# Patient Record
Sex: MALE | Race: WHITE | URBAN - METROPOLITAN AREA
[De-identification: names, ages, dates, MRNs, and addresses within clinical notes are randomized per-mention and may not be internally consistent; named-entity substitution may affect disease eponyms.]

---

## 2017-03-09 ENCOUNTER — DOCUMENTATION ONLY (OUTPATIENT)
Dept: FAMILY MEDICINE CLINIC | Age: 42
End: 2017-03-09

## 2017-03-10 ENCOUNTER — OFFICE VISIT (OUTPATIENT)
Dept: FAMILY MEDICINE CLINIC | Age: 42
End: 2017-03-10

## 2017-03-10 ENCOUNTER — HOSPITAL ENCOUNTER (OUTPATIENT)
Dept: LAB | Age: 42
Discharge: HOME | End: 2017-03-10
Payer: COMMERCIAL

## 2017-03-10 VITALS
HEART RATE: 101 BPM | WEIGHT: 173 LBS | SYSTOLIC BLOOD PRESSURE: 139 MMHG | OXYGEN SATURATION: 100 % | RESPIRATION RATE: 20 BRPM | DIASTOLIC BLOOD PRESSURE: 100 MMHG | HEIGHT: 71 IN | TEMPERATURE: 99.1 F | BODY MASS INDEX: 24.22 KG/M2

## 2017-03-10 DIAGNOSIS — R44.3 HALLUCINATION: ICD-10-CM

## 2017-03-10 DIAGNOSIS — F10.939 ALCOHOL WITHDRAWAL, WITH UNSPECIFIED COMPLICATION (HCC): ICD-10-CM

## 2017-03-10 DIAGNOSIS — R25.1 TREMOR: Primary | ICD-10-CM

## 2017-03-10 DIAGNOSIS — R25.1 TREMOR: ICD-10-CM

## 2017-03-10 LAB
ALBUMIN SERPL BCP-MCNC: 4.7 G/DL (ref 3.4–5)
ALBUMIN/GLOB SERPL: 1.4 {RATIO} (ref 0.8–1.7)
ALP SERPL-CCNC: 48 U/L (ref 45–117)
ALT SERPL-CCNC: 84 U/L (ref 16–61)
AMMONIA PLAS-SCNC: 26 UMOL/L (ref 11–32)
ANION GAP BLD CALC-SCNC: 9 MMOL/L (ref 3–18)
AST SERPL W P-5'-P-CCNC: 68 U/L (ref 15–37)
BASOPHILS # BLD AUTO: 0 K/UL (ref 0–0.06)
BASOPHILS # BLD: 1 % (ref 0–2)
BILIRUB SERPL-MCNC: 1 MG/DL (ref 0.2–1)
BUN SERPL-MCNC: 12 MG/DL (ref 7–18)
BUN/CREAT SERPL: 13 (ref 12–20)
CALCIUM SERPL-MCNC: 9.3 MG/DL (ref 8.5–10.1)
CHLORIDE SERPL-SCNC: 101 MMOL/L (ref 100–108)
CO2 SERPL-SCNC: 27 MMOL/L (ref 21–32)
CREAT SERPL-MCNC: 0.91 MG/DL (ref 0.6–1.3)
DIFFERENTIAL METHOD BLD: ABNORMAL
EOSINOPHIL # BLD: 0.1 K/UL (ref 0–0.4)
EOSINOPHIL NFR BLD: 2 % (ref 0–5)
ERYTHROCYTE [DISTWIDTH] IN BLOOD BY AUTOMATED COUNT: 12.5 % (ref 11.6–14.5)
FOLATE SERPL-MCNC: 6.4 NG/ML (ref 3.1–17.5)
GLOBULIN SER CALC-MCNC: 3.3 G/DL (ref 2–4)
GLUCOSE SERPL-MCNC: 68 MG/DL (ref 74–99)
HCT VFR BLD AUTO: 42.6 % (ref 36–48)
HGB BLD-MCNC: 14.5 G/DL (ref 13–16)
LYMPHOCYTES # BLD AUTO: 31 % (ref 21–52)
LYMPHOCYTES # BLD: 1.5 K/UL (ref 0.9–3.6)
MCH RBC QN AUTO: 34.4 PG (ref 24–34)
MCHC RBC AUTO-ENTMCNC: 34 G/DL (ref 31–37)
MCV RBC AUTO: 100.9 FL (ref 74–97)
MONOCYTES # BLD: 0.8 K/UL (ref 0.05–1.2)
MONOCYTES NFR BLD AUTO: 17 % (ref 3–10)
NEUTS SEG # BLD: 2.5 K/UL (ref 1.8–8)
NEUTS SEG NFR BLD AUTO: 49 % (ref 40–73)
PLATELET # BLD AUTO: 135 K/UL (ref 135–420)
PMV BLD AUTO: 12.2 FL (ref 9.2–11.8)
POTASSIUM SERPL-SCNC: 3.8 MMOL/L (ref 3.5–5.5)
PROT SERPL-MCNC: 8 G/DL (ref 6.4–8.2)
RBC # BLD AUTO: 4.22 M/UL (ref 4.7–5.5)
SODIUM SERPL-SCNC: 137 MMOL/L (ref 136–145)
VIT B12 SERPL-MCNC: >2000 PG/ML (ref 211–911)
WBC # BLD AUTO: 5 K/UL (ref 4.6–13.2)

## 2017-03-10 PROCEDURE — 80053 COMPREHEN METABOLIC PANEL: CPT | Performed by: INTERNAL MEDICINE

## 2017-03-10 PROCEDURE — 85025 COMPLETE CBC W/AUTO DIFF WBC: CPT | Performed by: INTERNAL MEDICINE

## 2017-03-10 PROCEDURE — 84425 ASSAY OF VITAMIN B-1: CPT | Performed by: INTERNAL MEDICINE

## 2017-03-10 PROCEDURE — 82140 ASSAY OF AMMONIA: CPT | Performed by: INTERNAL MEDICINE

## 2017-03-10 PROCEDURE — 82607 VITAMIN B-12: CPT | Performed by: INTERNAL MEDICINE

## 2017-03-10 PROCEDURE — 36415 COLL VENOUS BLD VENIPUNCTURE: CPT | Performed by: INTERNAL MEDICINE

## 2017-03-10 RX ORDER — CLONIDINE HYDROCHLORIDE 0.1 MG/1
0.1 TABLET ORAL 2 TIMES DAILY
Qty: 60 TAB | Refills: 1 | Status: SHIPPED | OUTPATIENT
Start: 2017-03-10 | End: 2017-03-24 | Stop reason: SDUPTHER

## 2017-03-10 RX ORDER — LORAZEPAM 1 MG/1
1 TABLET ORAL 3 TIMES DAILY
Qty: 45 TAB | Refills: 0 | Status: SHIPPED | OUTPATIENT
Start: 2017-03-10 | End: 2017-03-24 | Stop reason: SDUPTHER

## 2017-03-10 RX ORDER — LANOLIN ALCOHOL/MO/W.PET/CERES
1000 CREAM (GRAM) TOPICAL DAILY
COMMUNITY

## 2017-03-10 RX ORDER — LANOLIN ALCOHOL/MO/W.PET/CERES
100 CREAM (GRAM) TOPICAL DAILY
Qty: 15 TAB | Refills: 0 | Status: SHIPPED | OUTPATIENT
Start: 2017-03-10

## 2017-03-10 NOTE — MR AVS SNAPSHOT
Visit Information Date & Time Provider Department Dept. Phone Encounter #  
 3/10/2017 11:30 AM Deneen Davis MD [employer] [phone] [id number] Follow-up Instructions Return in about 2 weeks (around 3/24/2017). Follow-up and Disposition History Upcoming Health Maintenance Date Due DTaP/Tdap/Td series (1 - Tdap) 10/4/1998 INFLUENZA AGE 9 TO ADULT 8/1/2016 Allergies as of 3/10/2017  Review Complete On: 3/10/2017 By: Deneen Davis MD  
  
 Severity Noted Reaction Type Reactions Cat Dander  04/13/2015    Itching Current Immunizations  Never Reviewed No immunizations on file. Not reviewed this visit You Were Diagnosed With   
  
 Codes Comments Tremor    -  Primary ICD-10-CM: R25.1 ICD-9-CM: 781.0 Alcohol withdrawal, with unspecified complication ([facility] 75.)     DLU-28-BS: N76.678 ICD-9-CM: 291.81 Hallucination     ICD-10-CM: R44.3 ICD-9-CM: 780.1 Vitals BP Pulse Temp Resp Height(growth percentile) Weight(growth percentile) (!) 139/100 (BP 1 Location: Right arm, BP Patient Position: Sitting) (!) 101 99.1 °F (37.3 °C) (Oral) 20 5' 11\" (1.803 m) 173 lb (78.5 kg) SpO2 BMI Smoking Status 100% 24.13 kg/m2 Never Smoker BMI and BSA Data Body Mass Index Body Surface Area  
 24.13 kg/m 2 1.98 m 2 Preferred Pharmacy Pharmacy Name Phone 350 [address] 1003 [facility] [phone] Your Updated Medication List  
  
   
This list is accurate as of: 3/10/17 12:29 PM.  Always use your most recent med list.  
  
  
  
  
 cloNIDine HCl 0.1 mg tablet Commonly known as:  CATAPRES Take 1 Tab by mouth two (2) times a day. * VITAMIN B-12 1,000 mcg tablet Generic drug:  cyanocobalamin Take 1,000 mcg by mouth daily. * cyanocobalamin 1,000 mcg/mL injection Commonly known as:  VITAMIN B-12  
 1 mL by IntraMUSCular route every fourteen (14) days. diazePAM 5 mg tablet Commonly known as:  VALIUM Take 1 every 6 hours as needed for dizziness, ear discomfort  
  
 folic acid 1 mg tablet Commonly known as:  Google Take 1 Tab by mouth daily. LORazepam 1 mg tablet Commonly known as:  ATIVAN Take 1 Tab by mouth three (3) times daily. Max Daily Amount: 3 mg. niCARdipine 20 mg capsule Commonly known as:  Nilson Sinks Take 1 Cap by mouth two (2) times a day. predniSONE 10 mg dose pack Commonly known as:  STERAPRED DS See administration instruction per 10mg dose pack Syringe with Needle (Disp) 1 mL 27 x 1/2\" Syrg Commonly known as:  BD TUBERCULIN SYRINGE  
1 Syringe by Does Not Apply route every fourteen (14) days. thiamine 100 mg tablet Commonly known as:  B-1 Take 1 Tab by mouth daily. valACYclovir 1 gram tablet Commonly known as:  VALTREX Take 1 Tab by mouth three (3) times daily. vit B Cmplx 3-FA-Vit C-Biotin 1- mg-mg-mcg tablet Commonly known as:  NEPHRO TAWNYA RX Take 1 Tab by mouth daily. * Notice: This list has 2 medication(s) that are the same as other medications prescribed for you. Read the directions carefully, and ask your doctor or other care provider to review them with you. Prescriptions Printed Refills LORazepam (ATIVAN) 1 mg tablet 0 Sig: Take 1 Tab by mouth three (3) times daily. Max Daily Amount: 3 mg. Class: Print Route: Oral  
  
Prescriptions Sent to Pharmacy Refills  
 thiamine (B-1) 100 mg tablet 0 Sig: Take 1 Tab by mouth daily. Class: Normal  
 Pharmacy: [facility] 6000 [facility] 98, [address] 2796 [facility] Ph #: [phone] Route: Oral  
 vit B Cmplx 3-FA-Vit C-Biotin (NEPHRO TAWNYA RX) 1- mg-mg-mcg tablet 1 Sig: Take 1 Tab by mouth daily.   
 Class: Normal  
 Pharmacy: [facility] [address] [place] [address], [address] [place] [address]. [address] Manjit Villegas Ph #: [phone] Route: Oral  
 cloNIDine HCl (CATAPRES) 0.1 mg tablet 1 Sig: Take 1 Tab by mouth two (2) times a day. Class: Normal  
 Pharmacy: [facility] [address] [address], [address] [place] [address]. [address] Manjit Villegas Ph #: [phone] Route: Oral  
  
Follow-up Instructions Return in about 2 weeks (around 3/24/2017). To-Do List   
 03/10/2017 Lab:  AMMONIA   
  
 03/10/2017 Lab:  VITAMIN B1, WHOLE BLOOD   
  
 03/10/2017 Lab:  VITAMIN B12 & FOLATE Introducing [facility] [place]! Dear Megan Rojas: Thank you for requesting a [employer] account. Our records indicate that you already have an active [employer] account. You can access your account anytime at [email or web address] Did you know that you can access your hospital and ER discharge instructions at any time in [employer]? You can also review all of your test results from your hospital stay or ER visit. Additional Information If you have questions, please visit the Frequently Asked Questions section of the [employer] website at [email or web address] Remember, [employer] is NOT to be used for urgent needs. For medical emergencies, dial 911. Now available from your iPhone and Android! Please provide this summary of care documentation to your next provider. Your primary care clinician is listed as Otilia Fitzpatrick. If you have any questions after today's visit, please call [phone].

## 2017-03-10 NOTE — PROGRESS NOTES
HISTORY OF PRESENT ILLNESS  Dheeraj Lozano is a 46 y.o. male. HPI  Recent hallucinations and tremor following alcohol cessation  A/w adrenalin activity ( palpitations, sweats)  Chronic heavy alcohol use   C/o tinnitus  Review of Systems   HENT: Positive for tinnitus. Cardiovascular: Positive for palpitations. Neurological: Positive for tremors. Psychiatric/Behavioral: Positive for hallucinations. All other systems reviewed and are negative. Past Medical History:   Diagnosis Date    Ankle fracture, left     Hypertension        Current Outpatient Prescriptions:     cyanocobalamin (VITAMIN B-12) 1,000 mcg tablet, Take 1,000 mcg by mouth daily. , Disp: , Rfl:     diazepam (VALIUM) 5 mg tablet, Take 1 every 6 hours as needed for dizziness, ear discomfort, Disp: 60 Tab, Rfl: 1    cyanocobalamin (VITAMIN B-12) 1,000 mcg/mL injection, 1 mL by IntraMUSCular route every fourteen (14) days. , Disp: 30 mL, Rfl: 0    Syringe with Needle, Disp, (BD TUBERCULIN SYRINGE) 1 mL 27 x 1/2\" syrg, 1 Syringe by Does Not Apply route every fourteen (14) days. , Disp: 12 Syringe, Rfl: 1    folic acid (FOLVITE) 1 mg tablet, Take 1 Tab by mouth daily. , Disp: 100 Tab, Rfl: 3    niCARdipine (CARDENE) 20 mg capsule, Take 1 Cap by mouth two (2) times a day., Disp: 60 Cap, Rfl: 0    valACYclovir (VALTREX) 1 gram tablet, Take 1 Tab by mouth three (3) times daily. , Disp: 30 Tab, Rfl: 0    predniSONE (STERAPRED DS) 10 mg dose pack, See administration instruction per 10mg dose pack, Disp: 21 Tab, Rfl: 0  Visit Vitals    BP (!) 139/100 (BP 1 Location: Right arm, BP Patient Position: Sitting)    Pulse (!) 101    Temp 99.1 °F (37.3 °C) (Oral)    Resp 20    Ht 5' 11\" (1.803 m)    Wt 173 lb (78.5 kg)    SpO2 100%    BMI 24.13 kg/m2       Physical Exam   Constitutional: He is oriented to person, place, and time. He appears well-developed and well-nourished. No distress.    Eyes: Conjunctivae are normal. Pupils are equal, round, and reactive to light. No scleral icterus. Neck: Normal range of motion. Neck supple. Cardiovascular: Normal rate, regular rhythm, normal heart sounds and intact distal pulses. Exam reveals no gallop and no friction rub. No murmur heard. Pulmonary/Chest: Effort normal and breath sounds normal. No respiratory distress. He has no wheezes. He has no rales. He exhibits no tenderness. Abdominal: Soft. Bowel sounds are normal. He exhibits no distension and no mass. There is no tenderness. There is no rebound and no guarding. Musculoskeletal: Normal range of motion. He exhibits no edema, tenderness or deformity. Lymphadenopathy:     He has no cervical adenopathy. Neurological: He is alert and oriented to person, place, and time. He displays normal reflexes. No cranial nerve deficit. He exhibits normal muscle tone. Coordination normal.   Gait normal  Romberg's negative   Skin: Skin is warm and dry. No rash noted. He is not diaphoretic. No erythema. No pallor. Psychiatric: His behavior is normal. Judgment and thought content normal.   anxious   Vitals reviewed.     Discussed options in treating alcohol withdrawal  ASSESSMENT and PLAN  alcohol withdrawal syndrome   Tinnitus  Alcoholic hallucinosis  Plan  Start clonidine 0.1 bid  ativan  Start thiamine/multivits  Consider campral  Labs  Recheck in 2 weeks

## 2017-03-10 NOTE — PROGRESS NOTES
Chief Complaint   Patient presents with    Hallucinations     Pain scale 0/10      1. Have you been to the ER, urgent care clinic since your last visit? Hospitalized since your last visit? No    2. Have you seen or consulted any other health care providers outside of the [facility] since your last visit? Include any pap smears or colon screening.  Yes Where: podiatry

## 2017-03-13 LAB — VIT B1 BLD-SCNC: 104.3 NMOL/L (ref 66.5–200)

## 2017-03-14 NOTE — PROGRESS NOTES
Spoke with patient and gave resulted note.  Patient stated he is doing better  Has appointment next Friday

## 2017-03-24 RX ORDER — LORAZEPAM 1 MG/1
1 TABLET ORAL
Qty: 45 TAB | Refills: 0 | OUTPATIENT
Start: 2017-03-24

## 2017-03-24 RX ORDER — CLONIDINE HYDROCHLORIDE 0.1 MG/1
0.1 TABLET ORAL 2 TIMES DAILY
Qty: 60 TAB | Refills: 1 | Status: SHIPPED | OUTPATIENT
Start: 2017-03-24

## 2017-03-24 NOTE — TELEPHONE ENCOUNTER
Pt late for appointment, marked to r/s. Unsure if Dr. Eloisa Wall wants to refill these meds. Please call back to advise.

## 2017-04-10 ENCOUNTER — OFFICE VISIT (OUTPATIENT)
Dept: FAMILY MEDICINE CLINIC | Age: 42
End: 2017-04-10

## 2017-04-10 VITALS
BODY MASS INDEX: 24.22 KG/M2 | TEMPERATURE: 98.4 F | SYSTOLIC BLOOD PRESSURE: 113 MMHG | OXYGEN SATURATION: 97 % | HEART RATE: 86 BPM | DIASTOLIC BLOOD PRESSURE: 70 MMHG | RESPIRATION RATE: 18 BRPM | WEIGHT: 173 LBS | HEIGHT: 71 IN

## 2017-04-10 DIAGNOSIS — F10.939 ALCOHOL WITHDRAWAL SYNDROME, WITH UNSPECIFIED COMPLICATION (HCC): Primary | ICD-10-CM

## 2017-04-10 RX ORDER — VALACYCLOVIR HYDROCHLORIDE 1 G/1
1000 TABLET, FILM COATED ORAL 3 TIMES DAILY
Qty: 15 TAB | Refills: 2 | Status: SHIPPED | OUTPATIENT
Start: 2017-04-10

## 2017-04-10 NOTE — PROGRESS NOTES
Chief Complaint   Patient presents with    Follow-up     Pain scale 0/10      1. Have you been to the ER, urgent care clinic since your last visit? Hospitalized since your last visit? No    2. Have you seen or consulted any other health care providers outside of the [address] [place] [address] since your last visit? Include any pap smears or colon screening.  Yes Where: dentist

## 2017-04-10 NOTE — MR AVS SNAPSHOT
Visit Information Date & Time Provider Department Dept. Phone Encounter #  
 4/10/2017 11:30 AM Amanda Brink, 3 [facility] [phone] [id number] Follow-up Instructions Return if symptoms worsen or fail to improve. Follow-up and Disposition History Upcoming Health Maintenance Date Due DTaP/Tdap/Td series (1 - Tdap) 10/4/1998 INFLUENZA AGE 11 TO ADULT 8/1/2016 Allergies as of 4/10/2017  Review Complete On: 4/10/2017 By: Amanda Brink MD  
  
 Severity Noted Reaction Type Reactions Cat Dander  04/13/2015    Itching Current Immunizations  Never Reviewed No immunizations on file. Not reviewed this visit You Were Diagnosed With   
  
 Codes Comments Alcohol withdrawal syndrome, with unspecified complication ([facility] 75.)    -  Primary ICD-10-CM: Q52.779 ICD-9-CM: 291.81 Vitals BP Pulse Temp Resp Height(growth percentile) Weight(growth percentile) 113/70 (BP 1 Location: Right arm, BP Patient Position: Sitting) 86 98.4 °F (36.9 °C) (Oral) 18 5' 11\" (1.803 m) 173 lb (78.5 kg) SpO2 BMI Smoking Status 97% 24.13 kg/m2 Never Smoker Vitals History BMI and BSA Data Body Mass Index Body Surface Area  
 24.13 kg/m 2 1.98 m 2 Preferred Pharmacy Pharmacy Name Phone [address]  0354 [facility] [phone] Your Updated Medication List  
  
   
This list is accurate as of: 4/10/17 12:08 PM.  Always use your most recent med list.  
  
  
  
  
 cloNIDine HCl 0.1 mg tablet Commonly known as:  CATAPRES Take 1 Tab by mouth two (2) times a day. * VITAMIN B-12 1,000 mcg tablet Generic drug:  cyanocobalamin Take 1,000 mcg by mouth daily. * cyanocobalamin 1,000 mcg/mL injection Commonly known as:  VITAMIN B-12  
1 mL by IntraMUSCular route every fourteen (14) days. diazePAM 5 mg tablet Commonly known as:  VALIUM Take 1 every 6 hours as needed for dizziness, ear discomfort  
  
 folic acid 1 mg tablet Commonly known as:  Google Take 1 Tab by mouth daily. LORazepam 1 mg tablet Commonly known as:  ATIVAN Take 1 Tab by mouth every six (6) hours as needed for Anxiety. Max Daily Amount: 4 mg. niCARdipine 20 mg capsule Commonly known as:  Viviane Kirti Take 1 Cap by mouth two (2) times a day. predniSONE 10 mg dose pack Commonly known as:  STERAPRED DS See administration instruction per 10mg dose pack Syringe with Needle (Disp) 1 mL 27 x 1/2\" Syrg Commonly known as:  BD TUBERCULIN SYRINGE  
1 Syringe by Does Not Apply route every fourteen (14) days. thiamine 100 mg tablet Commonly known as:  B-1 Take 1 Tab by mouth daily. valACYclovir 1 gram tablet Commonly known as:  VALTREX Take 1 Tab by mouth three (3) times daily. vit B Cmplx 3-FA-Vit C-Biotin 1- mg-mg-mcg tablet Commonly known as:  NEPHRO TAWNYA RX Take 1 Tab by mouth daily. * Notice: This list has 2 medication(s) that are the same as other medications prescribed for you. Read the directions carefully, and ask your doctor or other care provider to review them with you. Follow-up Instructions Return if symptoms worsen or fail to improve. Introducing [facility]! Dear Yomi Pitts: Thank you for requesting a [employer] account. Our records indicate that you already have an active [employer] account. You can access your account anytime at [email or web address] Did you know that you can access your hospital and ER discharge instructions at any time in [employer] You can also review all of your test results from your hospital stay or ER visit. Additional Information If you have questions, please visit the Frequently Asked Questions section of the [employer] website at [email or web address] Remember, [employer] is NOT to be used for urgent needs. For medical emergencies, dial 911. Now available from your iPhone and Android! Please provide this summary of care documentation to your next provider. Your primary care clinician is listed as Heron Sullivan. If you have any questions after today's visit, please call [phone].

## 2017-04-10 NOTE — PROGRESS NOTES
HISTORY OF PRESENT ILLNESS  Mary Anne Wright is a 46 y.o. male. HPI   alcohol withdrawal with successful treatment  Review of Systems   All other systems reviewed and are negative. Past Medical History:   Diagnosis Date    Ankle fracture, left     Hypertension      Current Outpatient Prescriptions on File Prior to Visit   Medication Sig Dispense Refill    LORazepam (ATIVAN) 1 mg tablet Take 1 Tab by mouth every six (6) hours as needed for Anxiety. Max Daily Amount: 4 mg. 45 Tab 0    cloNIDine HCl (CATAPRES) 0.1 mg tablet Take 1 Tab by mouth two (2) times a day. 60 Tab 1    cyanocobalamin (VITAMIN B-12) 1,000 mcg tablet Take 1,000 mcg by mouth daily.  thiamine (B-1) 100 mg tablet Take 1 Tab by mouth daily. 15 Tab 0    vit B Cmplx 3-FA-Vit C-Biotin (NEPHRO TAWNAY RX) 1- mg-mg-mcg tablet Take 1 Tab by mouth daily. 30 Tab 1    cyanocobalamin (VITAMIN B-12) 1,000 mcg/mL injection 1 mL by IntraMUSCular route every fourteen (14) days. 30 mL 0    Syringe with Needle, Disp, (BD TUBERCULIN SYRINGE) 1 mL 27 x 1/2\" syrg 1 Syringe by Does Not Apply route every fourteen (14) days. 12 Syringe 1    folic acid (FOLVITE) 1 mg tablet Take 1 Tab by mouth daily. 100 Tab 3    niCARdipine (CARDENE) 20 mg capsule Take 1 Cap by mouth two (2) times a day. 60 Cap 0    diazepam (VALIUM) 5 mg tablet Take 1 every 6 hours as needed for dizziness, ear discomfort 60 Tab 1    valACYclovir (VALTREX) 1 gram tablet Take 1 Tab by mouth three (3) times daily. 30 Tab 0    predniSONE (STERAPRED DS) 10 mg dose pack See administration instruction per 10mg dose pack 21 Tab 0     No current facility-administered medications on file prior to visit.       Visit Vitals    /70 (BP 1 Location: Right arm, BP Patient Position: Sitting)    Pulse 86    Temp 98.4 °F (36.9 °C) (Oral)    Resp 18    Ht 5' 11\" (1.803 m)    Wt 173 lb (78.5 kg)    SpO2 97%    BMI 24.13 kg/m2         Physical Exam   Constitutional: He appears well-developed and well-nourished. No distress. Skin: He is not diaphoretic. Psychiatric: He has a normal mood and affect. His behavior is normal. Judgment and thought content normal.   Vitals reviewed.   reviewed labs    ASSESSMENT and PLAN  alcohol withdrawal   Plan  Stop all meds   F/u as needed